# Patient Record
Sex: FEMALE
[De-identification: names, ages, dates, MRNs, and addresses within clinical notes are randomized per-mention and may not be internally consistent; named-entity substitution may affect disease eponyms.]

---

## 2019-11-18 ENCOUNTER — APPOINTMENT (OUTPATIENT)
Dept: ORTHOPEDIC SURGERY | Facility: CLINIC | Age: 29
End: 2019-11-18
Payer: COMMERCIAL

## 2019-11-18 PROBLEM — Z00.00 ENCOUNTER FOR PREVENTIVE HEALTH EXAMINATION: Status: ACTIVE | Noted: 2019-11-18

## 2019-11-18 PROCEDURE — 73562 X-RAY EXAM OF KNEE 3: CPT | Mod: RT

## 2019-11-18 PROCEDURE — 99203 OFFICE O/P NEW LOW 30 MIN: CPT

## 2019-11-18 NOTE — HISTORY OF PRESENT ILLNESS
[de-identified] : Patient reports that the RIGHT knee has been bothering her on and off for about 6 months now, atraumatic.  She reports recent worsening in pain. Aching throbbing. Swelling. Some clicking/popping. Pain with walking. Patient reports pain with walking and using stairs/bending the knee. She reports she has been unable to exercise due to pain.

## 2019-11-18 NOTE — PHYSICAL EXAM
[de-identified] : Right knee\par \par Constitutional: \par The patient is healthy-appearing and in no apparent distress. \par \par Gait:\par The patient ambulates with a normal gait and no limp.\par \par Cardiovascular System: \par The capillary refill is less than 2 seconds. \par \par Skin: \par There are no skin abnormalities.\par \par Right Knee: \par \par Bony Palpation: \par There is no tenderness of the medial joint line. \par There is no tenderness of the lateral joint line.\par There is no tenderness of the medial femoral chondyle.\par There is no tenderness of the lateral femoral chondyle.\par There is no tenderness of the tibial tubercle.\par There is no tenderness of the superior patella.\par There is no tenderness of the inferior patella.\par There is tenderness of the medial patellar facet.\par There is tenderness of the lateral patellar facet.\par \par Soft Tissue Palpation: \par There is no tenderness of the medial retinaculum.\par There is no tenderness of the lateral retinaculum.\par There is no tenderness of the quadriceps tendon.\par There is no tenderness of the patella tendon.\par There is no tenderness of the ITB.\par There is no tenderness of the pes anserine.\par \par Active Range of Motion: \par The range of motion at the knee actively and passively is full. \par \par Special Tests: \par There is a negative Apley.\par There is a negative Steinmanns. \par There is a negative Lachman and Anterior Drawer.\par There is a negative Posterior Drawer.  \par There is no varus or valgus laxity.\par \par Strength: \par There is 4/5 hip flexion and 5/5 knee flexion and extension.  \par \par Psychiatric: \par The patient demonstrates a normal mood and affect and is active and alert. [de-identified] : X-ray right knee. There is no significant bony abnormality arthritis or fracture other than apparent intra-articular osteochondral loose bodies with one in PF joint and one posterior to femoral chondyle

## 2019-11-18 NOTE — ASSESSMENT
[FreeTextEntry1] : Discussed at length with the patient and her history as well as imaging and recommendation for MRI of the knee given loose bodies in the knee.

## 2020-01-28 ENCOUNTER — APPOINTMENT (OUTPATIENT)
Dept: ORTHOPEDIC SURGERY | Facility: CLINIC | Age: 30
End: 2020-01-28
Payer: COMMERCIAL

## 2020-01-28 PROCEDURE — 99214 OFFICE O/P EST MOD 30 MIN: CPT

## 2020-01-28 NOTE — ASSESSMENT
[FreeTextEntry1] : Discussed at length with patient the underlying pathology within the knee and the MRI.  Recommendation for arthroscopy with possible mini arthrotomy for chondral restoration procedure as well as loose body removal.  Detailed at length chondral restoration and procedures being microfracture, oats procedure and possibly biocartilage.  Detailed at length with patient the surgeries as well as expectations and the need for strict nonweightbearing for 6 weeks postoperatively.  Spent greater than 10 minutes reviewing postop protocol as and surgical options and patient elects to consider her options at this time.  Patient was informed of her delay to be presenting for discussion as well as risks for chronic delay to any surgical procedure and that without surgery most likely she will ultimately require a knee replacement

## 2020-01-28 NOTE — PHYSICAL EXAM
[de-identified] : Right knee\par \par Constitutional: \par The patient is healthy-appearing and in no apparent distress. \par \par Gait:\par The patient ambulates with a normal gait and no limp.\par \par Cardiovascular System: \par The capillary refill is less than 2 seconds. \par \par Skin: \par There are no skin abnormalities.\par \par Right Knee: \par \par Bony Palpation: \par There is no tenderness of the medial joint line. \par There is no tenderness of the lateral joint line.\par There is no tenderness of the medial femoral chondyle.\par There is no tenderness of the lateral femoral chondyle.\par There is no tenderness of the tibial tubercle.\par There is no tenderness of the superior patella.\par There is no tenderness of the inferior patella.\par There is tenderness of the medial patellar facet.\par There is tenderness of the lateral patellar facet.\par \par Soft Tissue Palpation: \par There is no tenderness of the medial retinaculum.\par There is no tenderness of the lateral retinaculum.\par There is no tenderness of the quadriceps tendon.\par There is no tenderness of the patella tendon.\par There is no tenderness of the ITB.\par There is no tenderness of the pes anserine.\par \par Active Range of Motion: \par The range of motion at the knee actively and passively is full. \par \par Special Tests: \par There is a negative Apley.\par There is a negative Steinmanns. \par There is a negative Lachman and Anterior Drawer.\par There is a negative Posterior Drawer.  \par There is no varus or valgus laxity.\par \par Strength: \par There is 4/5 hip flexion and 5/5 knee flexion and extension.  \par \par Psychiatric: \par The patient demonstrates a normal mood and affect and is active and alert. [de-identified] : MRI of right knee reveals a moderate sized full-thickness osteochondral defect in the medial femoral condyle in the weightbearing portion as well as a loose body in the posterior aspect of the knee

## 2020-01-28 NOTE — HISTORY OF PRESENT ILLNESS
[de-identified] : Patient was last seen in the office on November 18, 2019 for right knee pain and discomfort and was recommended for an MRI.  Patient obtain the MRI in December 2, 2019 and multiple attempts by me to contact the patient which were unsuccessful to give her her report.  My office I contacted her on several occasions and recommended her to come into the office to review the MRI and patient for office status was resistant to coming in to review the imaging.  Patient is attending today to review the MRI and states persistent knee discomfort and inability to run secondary to pain.

## 2020-02-07 ENCOUNTER — APPOINTMENT (OUTPATIENT)
Dept: ORTHOPEDIC SURGERY | Facility: CLINIC | Age: 30
End: 2020-02-07
Payer: COMMERCIAL

## 2020-02-07 DIAGNOSIS — M89.9 DISORDER OF BONE, UNSPECIFIED: ICD-10-CM

## 2020-02-07 DIAGNOSIS — M94.9 DISORDER OF BONE, UNSPECIFIED: ICD-10-CM

## 2020-02-07 DIAGNOSIS — M23.40 LOOSE BODY IN KNEE, UNSPECIFIED KNEE: ICD-10-CM

## 2020-02-07 DIAGNOSIS — M23.90 UNSPECIFIED INTERNAL DERANGEMENT OF UNSPECIFIED KNEE: ICD-10-CM

## 2020-02-07 PROCEDURE — 73562 X-RAY EXAM OF KNEE 3: CPT | Mod: LT

## 2020-02-07 PROCEDURE — 99213 OFFICE O/P EST LOW 20 MIN: CPT

## 2020-02-07 RX ORDER — CELECOXIB 100 MG/1
100 CAPSULE ORAL
Qty: 60 | Refills: 0 | Status: ACTIVE | COMMUNITY
Start: 2020-02-07 | End: 1900-01-01

## 2020-02-10 ENCOUNTER — APPOINTMENT (OUTPATIENT)
Dept: ORTHOPEDIC SURGERY | Facility: CLINIC | Age: 30
End: 2020-02-10

## 2020-02-10 NOTE — HISTORY OF PRESENT ILLNESS
[de-identified] : 29 year old female known to the practice presents with increased right knee pain as well as new left knee pain.  She previously had a MRI of her right knee showing a large osteochondral defect for which she is followed by Dr. Roth.  She is currently deciding on surgical treatment.  She says she is trying to decide if she should have surgery here or in the [place].  She says the pain is bothering her more in the right knee.  She also describes pain and swelling of her left knee and says that it feels like the same symptoms she has in the right knee.

## 2020-02-10 NOTE — ASSESSMENT
[FreeTextEntry1] : 29 year old female with bilateral knee pain and known osteochondral lesion and intraarticular loose body of the right knee.  She says she now has the same symptoms on the left knee.  She says her bilateral knee pain has gotten worse.  She is currently deciding about surgery for her right knee with Dr. Roth.  She will be sent for a MRI of the left knee to evaluate for OCD.  We will contact her with the results of the MRI.  She was given a prescription for Celebrex.  She is planning on following up with Dr. Roth.  She knows to call with any questions or concerns or if her symptoms acutely worsen.